# Patient Record
Sex: MALE | Employment: FULL TIME | URBAN - METROPOLITAN AREA
[De-identification: names, ages, dates, MRNs, and addresses within clinical notes are randomized per-mention and may not be internally consistent; named-entity substitution may affect disease eponyms.]

---

## 2025-06-13 ENCOUNTER — CLINICAL SUPPORT (OUTPATIENT)
Dept: AUDIOLOGY | Facility: CLINIC | Age: 35
End: 2025-06-13
Payer: COMMERCIAL

## 2025-06-13 DIAGNOSIS — H93.8X1 EAR FULLNESS, RIGHT: Primary | ICD-10-CM

## 2025-06-13 PROCEDURE — 92550 TYMPANOMETRY & REFLEX THRESH: CPT | Mod: 52 | Performed by: AUDIOLOGIST

## 2025-06-13 PROCEDURE — 92557 COMPREHENSIVE HEARING TEST: CPT | Performed by: AUDIOLOGIST

## 2025-06-13 ASSESSMENT — PAIN SCALES - GENERAL: PAINLEVEL_OUTOF10: 0 - NO PAIN

## 2025-06-13 ASSESSMENT — PAIN - FUNCTIONAL ASSESSMENT: PAIN_FUNCTIONAL_ASSESSMENT: 0-10

## 2025-06-13 NOTE — PROGRESS NOTES
"AUDIOLOGY ADULT AUDIOMETRIC EVALUATION      Name:  Giancarlo Dailey  :  1990  Age:  34 y.o.  Date of Evaluation:  2025    HISTORY  Reason for visit:   otalgia  Mr. Dailey is seen 2025 at the request of LIVAN Leslie FNP-C  for an evaluation of hearing.   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    RESULTS   Otoscopic Evaluation: clear canals bilaterally      Immittance Measures (226 Hz probe tone):   Tympanometry is consistent with normal middle ear pressure and normal tympanic membrane mobility bilaterally.       Ipsilateral acoustic reflexes were present for 500-4000 Hz bilaterally.       Test technique:  standard behavioral technique via TDH earphones (checked with insert earphones).  Reliability is good.    Pure Tone Audiometry:  Hearing sensitivity is within the range of normal hearing for 250-8000 Hz; note, however, asymmetry for 8000 Hz (right worse than left)     Speech Audiometry:        Right Ear:  Speech Reception Threshold (SRT) was obtained at 10 dBHL                 Speech discrimination score was 100% in quiet when words were presented at 60 dBHL (10 item NU-6 ordered-by-difficulty list).        Left Ear:  Speech " Reception Threshold (SRT) was obtained at 10 dBHL                 Speech discrimination score was 100% in quiet when words were presented at 60 dBHL (10 item NU-6 ordered-by-difficulty list).  -    IMPRESSIONS:  Tympanometry is consistent with normal middle ear pressure and normal tympanic membrane mobility bilaterally.      Hearing sensitivity is within the range of normal hearing for 250-8000 Hz; note, however, asymmetry for 8000 Hz (right worse than left)     RECOMMENDATIONS  Continue with medical follow-up with LIVAN Leslie, TEDDYC.  Reassess hearing in 1 year (or sooner if medically indicated or if there is a concern for a change in hearing).    Continue with medical follow-up as indicated.      PATIENT EDUCATION  Discussed results and recommendations with patient.  Questions were addressed and the patient was encouraged to contact our department should concerns arise.       MARGO Taylor, CCC-A  Licensed Audiologist

## 2025-06-19 ASSESSMENT — ENCOUNTER SYMPTOMS
VOMITING: 0
ABDOMINAL PAIN: 0
COUGH: 0
HEADACHES: 0
DIARRHEA: 0
NECK PAIN: 0
SORE THROAT: 0
RHINORRHEA: 0

## 2025-06-25 ENCOUNTER — APPOINTMENT (OUTPATIENT)
Dept: OTOLARYNGOLOGY | Facility: CLINIC | Age: 35
End: 2025-06-25
Payer: COMMERCIAL